# Patient Record
Sex: MALE | Race: WHITE
[De-identification: names, ages, dates, MRNs, and addresses within clinical notes are randomized per-mention and may not be internally consistent; named-entity substitution may affect disease eponyms.]

---

## 2019-07-01 PROBLEM — Z00.00 ENCOUNTER FOR PREVENTIVE HEALTH EXAMINATION: Status: ACTIVE | Noted: 2019-07-01

## 2019-07-16 ENCOUNTER — APPOINTMENT (OUTPATIENT)
Dept: CARDIOLOGY | Facility: CLINIC | Age: 84
End: 2019-07-16
Payer: MEDICARE

## 2019-07-16 VITALS
BODY MASS INDEX: 26.33 KG/M2 | HEART RATE: 60 BPM | WEIGHT: 158 LBS | DIASTOLIC BLOOD PRESSURE: 62 MMHG | HEIGHT: 65 IN | SYSTOLIC BLOOD PRESSURE: 150 MMHG

## 2019-07-16 DIAGNOSIS — Z86.018 PERSONAL HISTORY OF OTHER BENIGN NEOPLASM: ICD-10-CM

## 2019-07-16 DIAGNOSIS — Z87.438 PERSONAL HISTORY OF OTHER DISEASES OF MALE GENITAL ORGANS: ICD-10-CM

## 2019-07-16 DIAGNOSIS — Z87.39 PERSONAL HISTORY OF OTHER DISEASES OF THE MUSCULOSKELETAL SYSTEM AND CONNECTIVE TISSUE: ICD-10-CM

## 2019-07-16 DIAGNOSIS — Z86.79 PERSONAL HISTORY OF OTHER DISEASES OF THE CIRCULATORY SYSTEM: ICD-10-CM

## 2019-07-16 DIAGNOSIS — Z87.891 PERSONAL HISTORY OF NICOTINE DEPENDENCE: ICD-10-CM

## 2019-07-16 PROCEDURE — 93000 ELECTROCARDIOGRAM COMPLETE: CPT

## 2019-07-16 PROCEDURE — 99204 OFFICE O/P NEW MOD 45 MIN: CPT

## 2019-07-16 NOTE — REVIEW OF SYSTEMS
[Feeling Fatigued] : feeling fatigued [Blurry Vision] : blurred vision [Eyeglasses] : currently wearing eyeglasses [see HPI] : see HPI [Urinary Frequency] : urinary frequency [Joint Pain] : joint pain [Negative] : Heme/Lymph

## 2019-07-16 NOTE — HISTORY OF PRESENT ILLNESS
[FreeTextEntry1] : 89 y/o male presenting for another opinion on his cardiac problems. Patient has history of HTN, DL, A. fib, PPM, diastolic CHF, severe AS, CAD, carotid disease. Patient reports edema and dyspnea with exertion. ET limited to 1-2 blocks, which is a decline from last year. The patient was followed by Dr. Antoine. He was worked up for TAVR in 2017, but refused surgery. He had a repeat echo in May, revealing SYDNI of 0.5 cm. BP is elevated.

## 2019-07-16 NOTE — ASSESSMENT
[FreeTextEntry1] : Patient previously offered TAVR for symptomatic severe AS, but refused.\par Option of TAVR vs. medical management discussed.\par It is his preference to avoid an invasive procedure\par The symptoms of AS discussed with him.\par In terms of BP control, the patient was advised to increase hydralazine to TID.\par Agree with management as documented by Dr. Antoine.\par F/u with him as scheduled.\par

## 2019-07-16 NOTE — REASON FOR VISIT
[Initial Evaluation] : an initial evaluation of [Aortic Stenosis] : aortic stenosis [Atrial Fibrillation] : atrial fibrillation

## 2019-07-16 NOTE — PHYSICAL EXAM
[Normal Appearance] : normal appearance [Well Groomed] : well groomed [No Deformities] : no deformities [General Appearance - In No Acute Distress] : no acute distress [Normal Conjunctiva] : the conjunctiva exhibited no abnormalities [Normal Oral Mucosa] : normal oral mucosa [] : no respiratory distress [Respiration, Rhythm And Depth] : normal respiratory rhythm and effort [Auscultation Breath Sounds / Voice Sounds] : lungs were clear to auscultation bilaterally [Heart Rate And Rhythm] : heart rate and rhythm were normal [Heart Sounds] : normal S1 and S2 [Systolic grade ___/6] : A grade [unfilled]/6 systolic murmur was heard. [FreeTextEntry1] : trace edema [Bowel Sounds] : normal bowel sounds [Abdomen Soft] : soft [Abdomen Tenderness] : non-tender [Abnormal Walk] : normal gait [Nail Clubbing] : no clubbing of the fingernails [Cyanosis, Localized] : no localized cyanosis [Skin Color & Pigmentation] : normal skin color and pigmentation [Oriented To Time, Place, And Person] : oriented to person, place, and time

## 2019-10-18 ENCOUNTER — APPOINTMENT (OUTPATIENT)
Dept: CARDIOLOGY | Facility: CLINIC | Age: 84
End: 2019-10-18
Payer: MEDICARE

## 2019-10-18 VITALS — HEART RATE: 59 BPM | DIASTOLIC BLOOD PRESSURE: 60 MMHG | SYSTOLIC BLOOD PRESSURE: 128 MMHG

## 2019-10-18 PROCEDURE — 93000 ELECTROCARDIOGRAM COMPLETE: CPT

## 2019-10-18 PROCEDURE — 99213 OFFICE O/P EST LOW 20 MIN: CPT

## 2019-10-18 RX ORDER — SENNOSIDES 8.6 MG/1
8.6 CAPSULE, GELATIN COATED ORAL
Refills: 0 | Status: ACTIVE | COMMUNITY

## 2019-10-18 RX ORDER — METFORMIN HYDROCHLORIDE 1000 MG/1
1000 TABLET, COATED ORAL
Refills: 0 | Status: ACTIVE | COMMUNITY

## 2019-10-18 RX ORDER — HYDROCHLOROTHIAZIDE 25 MG/1
25 TABLET ORAL
Refills: 0 | Status: ACTIVE | COMMUNITY

## 2019-10-18 RX ORDER — TRAZODONE HCL 50 MG
50 TABLET ORAL
Refills: 0 | Status: ACTIVE | COMMUNITY

## 2019-10-18 NOTE — HISTORY OF PRESENT ILLNESS
[FreeTextEntry1] : 89 y/o male presenting for f/u his cardiac problems. Initially seen for second opinion, but would like to switch to us. No new events. BP is better today, but had some episodes of high BP at home.  Patient has history of HTN, DL, A. fib, PPM, diastolic CHF, severe AS, CAD, carotid disease. Patient reports edema and dyspnea with exertion. ET limited to 1-2 blocks, which is a decline from last year. The patient was followed by Dr. Antoine. He was worked up for TAVR in 2017, but refused surgery. He had a repeat echo in May, revealing SYDNI of 0.5 cm.

## 2019-10-18 NOTE — ASSESSMENT
[FreeTextEntry1] : Patient previously offered TAVR for symptomatic severe AS, but refused.\par Option of TAVR vs. medical management discussed again.\par It is his preference to avoid an invasive procedure\par The symptoms of AS discussed with him.\par In terms of BP control, the patient was advised to c/w hydralazine  TID and restart amlodipine 2.5 mg QD.\par Agree with management as documented by Dr. Antoine.\par F/u in 3 months.\par

## 2019-10-18 NOTE — PHYSICAL EXAM
[Normal Appearance] : normal appearance [Well Groomed] : well groomed [No Deformities] : no deformities [General Appearance - In No Acute Distress] : no acute distress [Normal Conjunctiva] : the conjunctiva exhibited no abnormalities [Normal Oral Mucosa] : normal oral mucosa [] : no respiratory distress [Auscultation Breath Sounds / Voice Sounds] : lungs were clear to auscultation bilaterally [Respiration, Rhythm And Depth] : normal respiratory rhythm and effort [Heart Rate And Rhythm] : heart rate and rhythm were normal [Systolic grade ___/6] : A grade [unfilled]/6 systolic murmur was heard. [Heart Sounds] : normal S1 and S2 [FreeTextEntry1] : trace edema [Bowel Sounds] : normal bowel sounds [Abdomen Soft] : soft [Abdomen Tenderness] : non-tender [Abnormal Walk] : normal gait [Nail Clubbing] : no clubbing of the fingernails [Cyanosis, Localized] : no localized cyanosis [Skin Color & Pigmentation] : normal skin color and pigmentation [Oriented To Time, Place, And Person] : oriented to person, place, and time

## 2020-01-17 ENCOUNTER — APPOINTMENT (OUTPATIENT)
Dept: CARDIOLOGY | Facility: CLINIC | Age: 85
End: 2020-01-17
Payer: MEDICARE

## 2020-01-17 ENCOUNTER — RESULT CHARGE (OUTPATIENT)
Age: 85
End: 2020-01-17

## 2020-01-17 PROCEDURE — 93000 ELECTROCARDIOGRAM COMPLETE: CPT

## 2020-01-17 PROCEDURE — 99213 OFFICE O/P EST LOW 20 MIN: CPT

## 2020-01-17 NOTE — ASSESSMENT
[FreeTextEntry1] : Patient previously offered TAVR for symptomatic severe AS, but refused.\par Option of TAVR vs. medical management discussed again.\par It is his preference to avoid an invasive procedure\par The symptoms of AS discussed with him.\par In terms of BP control, the patient was advised to c/w  amlodipine 5 mg QD, hydralazine can be used PRN\par \par F/u in 3 months.\par

## 2020-01-17 NOTE — PHYSICAL EXAM
[Normal Appearance] : normal appearance [Well Groomed] : well groomed [General Appearance - In No Acute Distress] : no acute distress [No Deformities] : no deformities [Normal Conjunctiva] : the conjunctiva exhibited no abnormalities [Normal Oral Mucosa] : normal oral mucosa [Heart Rate And Rhythm] : heart rate and rhythm were normal [Heart Sounds] : normal S1 and S2 [Systolic grade ___/6] : A grade [unfilled]/6 systolic murmur was heard. [FreeTextEntry1] : trace edema [] : no respiratory distress [Respiration, Rhythm And Depth] : normal respiratory rhythm and effort [Auscultation Breath Sounds / Voice Sounds] : lungs were clear to auscultation bilaterally [Bowel Sounds] : normal bowel sounds [Abdomen Soft] : soft [Abdomen Tenderness] : non-tender [Abnormal Walk] : normal gait [Cyanosis, Localized] : no localized cyanosis [Nail Clubbing] : no clubbing of the fingernails [Skin Color & Pigmentation] : normal skin color and pigmentation [Oriented To Time, Place, And Person] : oriented to person, place, and time

## 2020-01-17 NOTE — REVIEW OF SYSTEMS
[Feeling Fatigued] : feeling fatigued [Blurry Vision] : blurred vision [Eyeglasses] : currently wearing eyeglasses [Urinary Frequency] : urinary frequency [see HPI] : see HPI [Joint Pain] : joint pain [Negative] : Heme/Lymph

## 2020-01-17 NOTE — HISTORY OF PRESENT ILLNESS
[FreeTextEntry1] : 89 y/o male presenting for f/u his cardiac problems. No new events. BP is better today, but had some episodes of high BP at home, most measurements are normal.  Patient has history of HTN, DL, A. fib, PPM, diastolic CHF, severe AS, CAD, carotid disease. Patient reports edema and dyspnea with exertion. + dizziness. ET limited to 3 blocks. The patient was followed by Dr. Antoine. He was worked up for TAVR in 2017, but refused surgery. He had a repeat echo in May, revealing SYDNI of 0.5 cm.

## 2020-05-13 ENCOUNTER — APPOINTMENT (OUTPATIENT)
Dept: CARDIOLOGY | Facility: CLINIC | Age: 85
End: 2020-05-13
Payer: MEDICARE

## 2020-05-13 PROCEDURE — 99213 OFFICE O/P EST LOW 20 MIN: CPT | Mod: 95

## 2020-05-13 NOTE — HISTORY OF PRESENT ILLNESS
[Formal Caregiver] : formal caregiver [Medical Office: ([facility])___] : at the medical office located in  [Home] : at home, [unfilled] , at the time of the visit. [Patient] : the patient [Self] : self [FreeTextEntry1] : Telemedicine visit:\par \par 89 y/o male presenting for f/u his cardiac problems. Had increased edema in LE, was started on Lasix by PMD with some improvement.  Patient has history of HTN, DL, A. fib, PPM, diastolic CHF, severe AS, CAD, carotid disease. Patient reports edema and dyspnea with exertion. + dizziness. ET limited to 3 blocks. The patient was followed by Dr. Antoine. He was worked up for TAVR in 2017, but refused surgery. He had a repeat echo in May 2019, revealing SYDNI of 0.5 cm.

## 2020-05-13 NOTE — REVIEW OF SYSTEMS
[Feeling Fatigued] : feeling fatigued [Eyeglasses] : currently wearing eyeglasses [Blurry Vision] : blurred vision [see HPI] : see HPI [Urinary Frequency] : urinary frequency [Joint Pain] : joint pain [Negative] : Endocrine

## 2020-05-13 NOTE — ASSESSMENT
[FreeTextEntry1] : Due to COVID-19 epidemic, a telemedicine visit was conducted, using audio and video connection. Patient gave verbal consent to telemedicine visit.\par \par Patient previously offered TAVR for symptomatic severe AS, but refused.\par It is his preference to avoid an invasive procedure\par The symptoms of AS discussed with him.\par In terms of BP control, the patient was advised to c/w  amlodipine 5 mg QD, hydralazine can be used PRN\par C/w low dose diuretic for edema control.\par \par F/u in 3 months.\par

## 2020-05-13 NOTE — PHYSICAL EXAM
[Well Groomed] : well groomed [Normal Appearance] : normal appearance [No Deformities] : no deformities [General Appearance - In No Acute Distress] : no acute distress [Normal Conjunctiva] : the conjunctiva exhibited no abnormalities [FreeTextEntry1] : + edema [] : no respiratory distress [Oriented To Time, Place, And Person] : oriented to person, place, and time [Affect] : the affect was normal

## 2020-05-26 ENCOUNTER — APPOINTMENT (OUTPATIENT)
Dept: CARDIOLOGY | Facility: CLINIC | Age: 85
End: 2020-05-26
Payer: MEDICARE

## 2020-05-26 VITALS
SYSTOLIC BLOOD PRESSURE: 100 MMHG | WEIGHT: 158 LBS | HEART RATE: 138 BPM | BODY MASS INDEX: 26.29 KG/M2 | DIASTOLIC BLOOD PRESSURE: 60 MMHG

## 2020-05-26 PROCEDURE — 99214 OFFICE O/P EST MOD 30 MIN: CPT

## 2020-05-26 PROCEDURE — 93000 ELECTROCARDIOGRAM COMPLETE: CPT

## 2020-05-26 RX ORDER — DICLOFENAC SODIUM 10 MG/G
1 GEL TOPICAL DAILY
Qty: 1 | Refills: 3 | Status: ACTIVE | COMMUNITY
Start: 2020-05-26 | End: 1900-01-01

## 2020-05-26 RX ORDER — NEBIVOLOL HYDROCHLORIDE 10 MG/1
10 TABLET ORAL
Refills: 0 | Status: DISCONTINUED | COMMUNITY
End: 2020-05-26

## 2020-05-26 NOTE — ASSESSMENT
[FreeTextEntry1] : 89 yo male with pmhx and presentation as above\par a.fib with rvr\par hx of as/chf\par \par change to Lopressor 25 q8 hours \par stop toprol and hold other bp meds if bp remains low\par eliquis 2.5 bid\par rtc with dr. woods 1 week\par if worsening sob/cp/loc- ED\par \par \par

## 2020-05-26 NOTE — PHYSICAL EXAM
[Normal Appearance] : normal appearance [Well Groomed] : well groomed [General Appearance - In No Acute Distress] : no acute distress [No Deformities] : no deformities [Normal Conjunctiva] : the conjunctiva exhibited no abnormalities [Normal Oral Mucosa] : normal oral mucosa [] : no respiratory distress [Respiration, Rhythm And Depth] : normal respiratory rhythm and effort [Heart Rate And Rhythm] : heart rate and rhythm were normal [Auscultation Breath Sounds / Voice Sounds] : lungs were clear to auscultation bilaterally [Heart Sounds] : normal S1 and S2 [Irregularly Irregular] : the rhythm was irregularly irregular [Tachycardic ___] : the heart rate was tachycardic at [unfilled] bpm [FreeTextEntry1] : 1+ edema [Systolic grade ___/6] : A grade [unfilled]/6 systolic murmur was heard. [Abdomen Soft] : soft [Bowel Sounds] : normal bowel sounds [Abdomen Tenderness] : non-tender [Nail Clubbing] : no clubbing of the fingernails [Cyanosis, Localized] : no localized cyanosis [Oriented To Time, Place, And Person] : oriented to person, place, and time [Skin Color & Pigmentation] : normal skin color and pigmentation

## 2020-05-26 NOTE — REVIEW OF SYSTEMS
[Blurry Vision] : blurred vision [Feeling Fatigued] : feeling fatigued [Eyeglasses] : currently wearing eyeglasses [see HPI] : see HPI [Joint Pain] : joint pain [Urinary Frequency] : urinary frequency [Negative] : Heme/Lymph

## 2020-05-26 NOTE — HISTORY OF PRESENT ILLNESS
[FreeTextEntry1] : 89 y/o male presenting for f/u his cardiac problems. \par Patient has history of HTN, DL, A. fib, PPM, diastolic CHF, severe AS, CAD, carotid disease. \par Patient reports not feeling well last few days, hr in 130s\par + edema and dyspnea with minimal exertion. + dizziness. ET has gone down sign\par ros is otherwise as below

## 2020-05-29 ENCOUNTER — APPOINTMENT (OUTPATIENT)
Dept: CARDIOLOGY | Facility: CLINIC | Age: 85
End: 2020-05-29
Payer: MEDICARE

## 2020-05-29 VITALS
SYSTOLIC BLOOD PRESSURE: 100 MMHG | WEIGHT: 158 LBS | DIASTOLIC BLOOD PRESSURE: 60 MMHG | HEART RATE: 97 BPM | BODY MASS INDEX: 26.29 KG/M2

## 2020-05-29 PROCEDURE — 99213 OFFICE O/P EST LOW 20 MIN: CPT

## 2020-05-29 PROCEDURE — 93000 ELECTROCARDIOGRAM COMPLETE: CPT

## 2020-05-29 NOTE — ASSESSMENT
[FreeTextEntry1] : C/w higher dose of metoprolol. If still uncontrolled, will increase to 100 mg.\par D/c amlodipine\par Patient previously offered TAVR for symptomatic severe AS, but refused.\par Option of TAVR vs. medical management discussed again.\par It is his preference to avoid an invasive procedure\par Switched to Eliquis 2.5 for AC (off Pradaxa)\par \par \par F/u in 2-3 weeks.\par

## 2020-05-29 NOTE — HISTORY OF PRESENT ILLNESS
[FreeTextEntry1] : 89 y/o male presenting for f/u his cardiac problems.  Patient has history of HTN, DL, A. fib, PPM, diastolic CHF, severe AS, CAD, carotid disease. Was recently seen by Dr. Givens for AF with RVR, his metoprolol was increased to 25 TID. Patient reports edema and dyspnea with exertion have improved. + dizziness. ET limited to 3-4 blocks. The patient was followed by Dr. Antoine. He was worked up for TAVR in 2017, but refused surgery. He had a repeat echo in May, revealing SYDNI of 0.5 cm.  Clinically he feels better today - still in AF.

## 2020-05-29 NOTE — REVIEW OF SYSTEMS
[Blurry Vision] : blurred vision [Feeling Fatigued] : feeling fatigued [Eyeglasses] : currently wearing eyeglasses [see HPI] : see HPI [Urinary Frequency] : urinary frequency [Joint Pain] : joint pain [Negative] : Heme/Lymph

## 2020-05-29 NOTE — PHYSICAL EXAM
[Normal Appearance] : normal appearance [Well Groomed] : well groomed [No Deformities] : no deformities [General Appearance - In No Acute Distress] : no acute distress [Normal Conjunctiva] : the conjunctiva exhibited no abnormalities [Normal Oral Mucosa] : normal oral mucosa [] : no respiratory distress [Auscultation Breath Sounds / Voice Sounds] : lungs were clear to auscultation bilaterally [Respiration, Rhythm And Depth] : normal respiratory rhythm and effort [Exaggerated Use Of Accessory Muscles For Inspiration] : no accessory muscle use [Heart Sounds] : normal S1 and S2 [Irregularly Irregular] : the rhythm was irregularly irregular [Systolic grade ___/6] : A grade [unfilled]/6 systolic murmur was heard. [Bowel Sounds] : normal bowel sounds [FreeTextEntry1] : 1+ edema [Abnormal Walk] : normal gait [Abdomen Soft] : soft [Abdomen Tenderness] : non-tender [Skin Color & Pigmentation] : normal skin color and pigmentation [Nail Clubbing] : no clubbing of the fingernails [Cyanosis, Localized] : no localized cyanosis [Oriented To Time, Place, And Person] : oriented to person, place, and time

## 2020-06-12 ENCOUNTER — APPOINTMENT (OUTPATIENT)
Dept: CARDIOLOGY | Facility: CLINIC | Age: 85
End: 2020-06-12
Payer: MEDICARE

## 2020-06-12 PROCEDURE — 99213 OFFICE O/P EST LOW 20 MIN: CPT | Mod: 25,95

## 2020-06-12 NOTE — REVIEW OF SYSTEMS
[Blurry Vision] : blurred vision [Feeling Fatigued] : feeling fatigued [Eyeglasses] : currently wearing eyeglasses [see HPI] : see HPI [Joint Pain] : joint pain [Urinary Frequency] : urinary frequency [Negative] : Endocrine

## 2020-06-12 NOTE — HISTORY OF PRESENT ILLNESS
[Home] : at home, [unfilled] , at the time of the visit. [Medical Office: ([facility])___] : at the medical office located in  [Verbal consent obtained from patient] : the patient, [unfilled] [Formal Caregiver] : formal caregiver [FreeTextEntry1] : Telemedicine visit:\par \par 89 y/o male presenting for f/u his cardiac problems.  Patient has history of HTN, DL, A. fib, PPM, diastolic CHF, severe AS, CAD, carotid disease. Was recently seen for AF with RVR, his metoprolol was increased to 25 TID. Patient reports edema and dyspnea with exertion have improved. + dizziness. ET limited to 3-4 blocks. The patient was followed by Dr. Antoine. He was worked up for TAVR in 2017, but refused surgery. He had a repeat echo in May, revealing SYDNI of 0.5 cm.  Clinically he feels better today - still in AF.\par \par

## 2020-06-12 NOTE — PHYSICAL EXAM
[Normal Appearance] : normal appearance [Well Groomed] : well groomed [No Deformities] : no deformities [General Appearance - In No Acute Distress] : no acute distress [FreeTextEntry1] : elderly male [Normal Conjunctiva] : the conjunctiva exhibited no abnormalities [Oriented To Time, Place, And Person] : oriented to person, place, and time [Normal Oral Mucosa] : normal oral mucosa

## 2020-06-12 NOTE — ASSESSMENT
[FreeTextEntry1] : Plan: Increase metoprolol to 50 mg q12\par C/w other medications.\par F/u in 1 months.\par D/w patient and HHA.\par \par \par Patient previously offered TAVR for symptomatic severe AS, but refused.\par \par It is his preference to avoid an invasive procedure\par Switched to Eliquis 2.5 for AC (off Pradaxa)\par \par \par F/u in 1 month\par

## 2020-07-08 ENCOUNTER — APPOINTMENT (OUTPATIENT)
Dept: CARDIOLOGY | Facility: CLINIC | Age: 85
End: 2020-07-08

## 2020-07-17 ENCOUNTER — APPOINTMENT (OUTPATIENT)
Dept: CARDIOLOGY | Facility: CLINIC | Age: 85
End: 2020-07-17
Payer: MEDICARE

## 2020-07-17 ENCOUNTER — APPOINTMENT (OUTPATIENT)
Dept: CARDIOLOGY | Facility: CLINIC | Age: 85
End: 2020-07-17

## 2020-07-17 PROCEDURE — 99441: CPT | Mod: 95

## 2020-08-19 ENCOUNTER — RESULT CHARGE (OUTPATIENT)
Age: 85
End: 2020-08-19

## 2020-08-19 ENCOUNTER — APPOINTMENT (OUTPATIENT)
Dept: CARDIOLOGY | Facility: CLINIC | Age: 85
End: 2020-08-19
Payer: MEDICARE

## 2020-08-19 VITALS
HEART RATE: 98 BPM | WEIGHT: 141 LBS | DIASTOLIC BLOOD PRESSURE: 82 MMHG | BODY MASS INDEX: 23.49 KG/M2 | HEIGHT: 65 IN | SYSTOLIC BLOOD PRESSURE: 100 MMHG

## 2020-08-19 DIAGNOSIS — E78.5 HYPERLIPIDEMIA, UNSPECIFIED: ICD-10-CM

## 2020-08-19 DIAGNOSIS — I10 ESSENTIAL (PRIMARY) HYPERTENSION: ICD-10-CM

## 2020-08-19 DIAGNOSIS — I35.0 NONRHEUMATIC AORTIC (VALVE) STENOSIS: ICD-10-CM

## 2020-08-19 DIAGNOSIS — I48.91 UNSPECIFIED ATRIAL FIBRILLATION: ICD-10-CM

## 2020-08-19 PROCEDURE — 93000 ELECTROCARDIOGRAM COMPLETE: CPT

## 2020-08-19 PROCEDURE — 99214 OFFICE O/P EST MOD 30 MIN: CPT

## 2020-08-19 RX ORDER — APIXABAN 2.5 MG/1
2.5 TABLET, FILM COATED ORAL
Qty: 180 | Refills: 3 | Status: ACTIVE | COMMUNITY
Start: 2020-05-26 | End: 1900-01-01

## 2020-08-19 RX ORDER — VALSARTAN AND HYDROCHLOROTHIAZIDE 160; 12.5 MG/1; MG/1
160-12.5 TABLET, FILM COATED ORAL DAILY
Qty: 90 | Refills: 0 | Status: ACTIVE | COMMUNITY
Start: 1900-01-01 | End: 1900-01-01

## 2020-08-19 RX ORDER — METOPROLOL TARTRATE 25 MG/1
25 TABLET, FILM COATED ORAL 3 TIMES DAILY
Qty: 90 | Refills: 1 | Status: COMPLETED | COMMUNITY
Start: 2020-05-26 | End: 2020-08-19

## 2020-08-19 RX ORDER — AMLODIPINE BESYLATE 10 MG/1
10 TABLET ORAL
Refills: 0 | Status: COMPLETED | COMMUNITY
End: 2020-08-19

## 2020-08-19 RX ORDER — METOPROLOL TARTRATE 50 MG/1
50 TABLET, FILM COATED ORAL TWICE DAILY
Qty: 60 | Refills: 5 | Status: COMPLETED | COMMUNITY
Start: 2020-06-12 | End: 2020-08-19

## 2020-08-19 RX ORDER — METOPROLOL SUCCINATE 100 MG/1
100 TABLET, EXTENDED RELEASE ORAL DAILY
Qty: 90 | Refills: 3 | Status: ACTIVE | COMMUNITY
Start: 1900-01-01 | End: 1900-01-01

## 2020-08-19 NOTE — ASSESSMENT
[FreeTextEntry1] : C/w metoprolol, will increase to 100 mg.\par Off amlodipine. Decrease Diovan to 160/12.5\par Patient previously offered TAVR for symptomatic severe AS, but refused.\par Option of TAVR vs. medical management discussed again.\par It is his preference to avoid an invasive procedure\par Switched to Eliquis 2.5 for AC (off Pradaxa) - c/w the same.\par Monitor symptoms.\par Discussed with him and the HHA in detail\par \par \par F/u in 2-3 weeks.\par

## 2020-08-19 NOTE — PHYSICAL EXAM
[Normal Appearance] : normal appearance [Well Groomed] : well groomed [No Deformities] : no deformities [General Appearance - In No Acute Distress] : no acute distress [Normal Conjunctiva] : the conjunctiva exhibited no abnormalities [Normal Oral Mucosa] : normal oral mucosa [] : no respiratory distress [Respiration, Rhythm And Depth] : normal respiratory rhythm and effort [Exaggerated Use Of Accessory Muscles For Inspiration] : no accessory muscle use [Auscultation Breath Sounds / Voice Sounds] : lungs were clear to auscultation bilaterally [Heart Sounds] : normal S1 and S2 [Irregularly Irregular] : the rhythm was irregularly irregular [Systolic grade ___/6] : A grade [unfilled]/6 systolic murmur was heard. [FreeTextEntry1] : trace edema [Bowel Sounds] : normal bowel sounds [Abdomen Soft] : soft [Abdomen Tenderness] : non-tender [Abnormal Walk] : normal gait [Nail Clubbing] : no clubbing of the fingernails [Cyanosis, Localized] : no localized cyanosis [Skin Color & Pigmentation] : normal skin color and pigmentation [Oriented To Time, Place, And Person] : oriented to person, place, and time

## 2020-08-19 NOTE — HISTORY OF PRESENT ILLNESS
[FreeTextEntry1] : 89 y/o male presenting for f/u his cardiac problems.  Patient has history of HTN, DL, A. fib, PPM, diastolic CHF, severe AS, CAD, carotid disease. Had episodes of AF with RVR, his metoprolol was increased to 25 TID. Still with dizziness, palpitations, HR 98 today. Patient reports edema and dyspnea with exertion have improved with Lasix. + dizziness. ET limited to 3-4 blocks. The patient was followed by Dr. Antoine. He was worked up for TAVR in 2017, but refused surgery. He had a repeat echo in May, revealing SYDIN of 0.5 cm.  Clinically he feels better today - still in AF with occasional V-pacing. BP is borderline low.

## 2020-11-11 ENCOUNTER — APPOINTMENT (OUTPATIENT)
Dept: CARDIOLOGY | Facility: CLINIC | Age: 85
End: 2020-11-11